# Patient Record
Sex: FEMALE | Race: WHITE | NOT HISPANIC OR LATINO
[De-identification: names, ages, dates, MRNs, and addresses within clinical notes are randomized per-mention and may not be internally consistent; named-entity substitution may affect disease eponyms.]

---
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
## 2022-04-04 ENCOUNTER — EMERGENCY (EMERGENCY)
Facility: HOSPITAL | Age: 22
LOS: 1 days | Discharge: HOME | End: 2022-04-04
Attending: EMERGENCY MEDICINE
Payer: COMMERCIAL

## 2022-04-04 VITALS
SYSTOLIC BLOOD PRESSURE: 116 MMHG | DIASTOLIC BLOOD PRESSURE: 81 MMHG | WEIGHT: 119.93 LBS | OXYGEN SATURATION: 99 % | RESPIRATION RATE: 18 BRPM | HEIGHT: 65 IN | HEART RATE: 96 BPM

## 2022-04-04 DIAGNOSIS — S61.512A LACERATION WITHOUT FOREIGN BODY OF LEFT WRIST, INITIAL ENCOUNTER: ICD-10-CM
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
## 2022-04-04 PROCEDURE — 12001 RPR S/N/AX/GEN/TRNK 2.5CM/<: CPT

## 2022-04-04 PROCEDURE — 99284 EMERGENCY DEPT VISIT MOD MDM: CPT | Mod: 25

## 2022-04-04 PROCEDURE — 99282 EMERGENCY DEPT VISIT SF MDM: CPT | Mod: 25

## 2022-04-04 NOTE — ED PROVIDER NOTE - NS ED ATTENDING STATEMENT MOD
This was a shared visit with the MICHELLE. I reviewed and verified the documentation and independently performed the documented:

## 2022-04-04 NOTE — ED PROVIDER NOTE - ATTENDING CONTRIBUTION TO CARE
Shaheen: I performed a face to face bedside interview with patient regarding history of present illness, review of symptoms and past medical history. I completed an independent physical exam.  I have discussed patient's plan of care with advanced care provider.   I agree with note as stated above including HISTORY OF PRESENT ILLNESS, HIV, PAST MEDICAL/SURGICAL/FAMILY/SOCIAL HISTORY, ALLERGIES AND HOME MEDICATIONS, REVIEW OF SYSTEMS, PHYSICAL EXAM, MEDICAL DECISION MAKING and any PROGRESS NOTES during the time I functioned as the attending physician for this patient  unless otherwise noted. My brief assessment is as follows: left wrist lac from  at work, unintentional, no tendon involvement, can range, neurovasuclalry intact. ~2cm. irrigated/wound closure by AMERICA Jaramillo, wound care instructions. return precautions and f/u.

## 2022-04-04 NOTE — ED PROVIDER NOTE - OBJECTIVE STATEMENT
pt is a 22 y/o female presenting to the ed for laceration to the left wrist. pt states she works at [employer], cut her wrist on a  today. pt up to date with tetanus, pt denies head injury or LOC. pt denies headache neck pain visual changes abd pain nausea vomiting numbness or loss of sensation cp sob

## 2022-04-04 NOTE — ED ADULT TRIAGE NOTE - CHIEF COMPLAINT QUOTE
Pt presents to ED for eval of laceration to left outer wrist. Pt states that she was cleaning her home and came in contact with an open  she has due to her jobs. Pt denies pain. No active bleeding noted.

## 2022-04-04 NOTE — ED PROVIDER NOTE - CLINICAL SUMMARY MEDICAL DECISION MAKING FREE TEXT BOX
laceration repair, wound care explained to patient signs of infection discussed  up to date with tetanus  suture removal in 10 days

## 2022-04-04 NOTE — ED PROVIDER NOTE - PHYSICAL EXAMINATION
Const: Awake, alert and oriented. In no acute distress. Well appearing.  HEENT: NC/AT. Moist mucous membranes.  Eyes: No scleral icterus. EOMI.  Neck:. Soft and supple. Full ROM without pain.  Cardiac:  +S1/S2. No murmurs. Peripheral pulses 2+ and symmetric. No LE edema.  Resp: Speaking in full sentences. No evidence of respiratory distress. No wheezes, rales or rhonchi.  Abd: Soft, non-tender, non-distended. Normal bowel sounds in all 4 quadrants. No guarding or rebound.  Back: Spine midline and non-tender. No CVAT.  MSK: FROM in all extremities neurovasculary intact radial pulse 2+ hand  5/5   Skin: lacaeration noted to left wrist 2.0 cm no tendon involvement   Lymph: No cervical lymphadenopathy.  Neuro: Awake, alert & oriented x 3. Moves all extremities symmetrically.

## 2022-04-04 NOTE — ED PROVIDER NOTE - PATIENT PORTAL LINK FT
You can access the FollowMyHealth Patient Portal offered by [facility] by registering at the following website: [email or web address]. By joining [facility]’s FollowMyHealth portal, you will also be able to view your health information using other applications (apps) compatible with our system.

## 2024-09-25 ENCOUNTER — NON-APPOINTMENT (OUTPATIENT)
Age: 24
End: 2024-09-25

## 2024-09-26 ENCOUNTER — APPOINTMENT (OUTPATIENT)
Dept: ORTHOPEDIC SURGERY | Facility: CLINIC | Age: 24
End: 2024-09-26

## 2024-09-26 VITALS — WEIGHT: 151 LBS | BODY MASS INDEX: 25.16 KG/M2 | HEIGHT: 65 IN

## 2024-09-26 DIAGNOSIS — M25.542 PAIN IN JOINTS OF LEFT HAND: ICD-10-CM

## 2024-09-26 PROBLEM — S52.122A LEFT RADIAL HEAD FRACTURE: Status: ACTIVE | Noted: 2024-09-26

## 2024-09-26 PROCEDURE — 73130 X-RAY EXAM OF HAND: CPT | Mod: LT

## 2024-09-26 PROCEDURE — 73080 X-RAY EXAM OF ELBOW: CPT | Mod: LT

## 2024-09-26 PROCEDURE — 99203 OFFICE O/P NEW LOW 30 MIN: CPT
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
## 2024-09-26 NOTE — PHYSICAL EXAM
[de-identified] : Left hand and upper extremity -Digits warm well-perfused, mildly swollen distally.  There is mild tenderness palpation over the ring finger mostly at the PIP joint.  She is able to make a full composite fist. -No wrist pain, no instability at the DRUJ.  No deformity throughout the hand and wrist. Left elbow -Passively full pronosupination, there is pain with supination and pronation but no block to motion -Able to flex and extend the elbow, unable to reach full terminal extension secondary to stiffness -No tenderness palpation throughout the remainder of the upper extremity [de-identified] : Three-view x-ray today including AP, lateral, oblique taken of the left elbow these demonstrate a minimally displaced radial head fracture in acceptable alignment for nonoperative parameters, slight articular step-off  Three-view x-ray left hand and 1 view lateral of the digit demonstrate no bony abnormalities, region of PIP joint where she had pain has no fractures, joint reduced and stable

## 2024-10-03 ENCOUNTER — APPOINTMENT (OUTPATIENT)
Dept: CT IMAGING | Facility: CLINIC | Age: 24
End: 2024-10-03
Payer: COMMERCIAL

## 2024-10-03 ENCOUNTER — APPOINTMENT (OUTPATIENT)
Dept: ORTHOPEDIC SURGERY | Facility: CLINIC | Age: 24
End: 2024-10-03

## 2024-10-03 DIAGNOSIS — S52.122A DISPLACED FRACTURE OF HEAD OF LEFT RADIUS, INITIAL ENCOUNTER FOR CLOSED FRACTURE: ICD-10-CM

## 2024-10-03 PROCEDURE — 73200 CT UPPER EXTREMITY W/O DYE: CPT | Mod: 26,LT

## 2024-10-03 PROCEDURE — 99213 OFFICE O/P EST LOW 20 MIN: CPT

## 2024-10-03 PROCEDURE — 73080 X-RAY EXAM OF ELBOW: CPT | Mod: LT

## 2024-10-03 NOTE — PHYSICAL EXAM
[de-identified] : Left hand and upper extremity  -No wrist pain, no instability at the DRUJ.  No deformity throughout the hand and wrist. Left elbow -Passively full pronosupination, there is pain with supination and pronation but no block to motion -Able to flex and extend the elbow, unable to reach full terminal extension secondary to stiffness -No tenderness palpation throughout the remainder of the upper extremity [de-identified] : 4 view x-ray including AP, lateral, oblique, and Jesus view of the left elbow taken today, is in acceptable alignment on the lateral views, on an AP view there is displacement of the fracture
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
## 2024-10-03 NOTE — ASSESSMENT
[FreeTextEntry1] : Given the patient's age and concern for worsening joint incongruity I would like to obtain a CT scan to further evaluate and define the radial head fracture as well as to find the size of the fragment seen on the AP view.  Discussed with Sophie that the steps going forward will be discussed over the phone, there may be a recommendation of surgery depending on the findings on the CT scan.  She verbalized understanding and acceptance of this plan.

## 2024-10-21 ENCOUNTER — APPOINTMENT (OUTPATIENT)
Dept: ORTHOPEDIC SURGERY | Facility: CLINIC | Age: 24
End: 2024-10-21
Payer: COMMERCIAL

## 2024-10-21 VITALS — WEIGHT: 154 LBS | HEIGHT: 64 IN | BODY MASS INDEX: 26.29 KG/M2

## 2024-10-21 DIAGNOSIS — S52.122A DISPLACED FRACTURE OF HEAD OF LEFT RADIUS, INITIAL ENCOUNTER FOR CLOSED FRACTURE: ICD-10-CM

## 2024-10-21 PROCEDURE — 99213 OFFICE O/P EST LOW 20 MIN: CPT

## 2024-10-21 PROCEDURE — 73080 X-RAY EXAM OF ELBOW: CPT | Mod: LT

## 2025-02-27 ENCOUNTER — OFFICE (OUTPATIENT)
Dept: URBAN - METROPOLITAN AREA CLINIC 100 | Facility: CLINIC | Age: 25
Setting detail: OPHTHALMOLOGY
End: 2025-02-27
Payer: COMMERCIAL

## 2025-02-27 DIAGNOSIS — H40.013: ICD-10-CM

## 2025-02-27 PROCEDURE — 92083 EXTENDED VISUAL FIELD XM: CPT | Performed by: OPHTHALMOLOGY

## 2025-02-27 PROCEDURE — 76514 ECHO EXAM OF EYE THICKNESS: CPT | Performed by: OPHTHALMOLOGY

## 2025-02-27 PROCEDURE — 99204 OFFICE O/P NEW MOD 45 MIN: CPT | Performed by: OPHTHALMOLOGY

## 2025-02-27 PROCEDURE — 92250 FUNDUS PHOTOGRAPHY W/I&R: CPT | Performed by: OPHTHALMOLOGY

## 2025-02-27 ASSESSMENT — REFRACTION_AUTOREFRACTION
OD_AXIS: 160
OS_CYLINDER: -0.25
OS_AXIS: 030
OS_SPHERE: +0.25
OD_SPHERE: -1.00
OD_CYLINDER: -3.00

## 2025-02-27 ASSESSMENT — KERATOMETRY
OD_K1POWER_DIOPTERS: 44.25
OD_AXISANGLE_DEGREES: 062
OS_K2POWER_DIOPTERS: 44.75
OS_AXISANGLE_DEGREES: 162
OS_K1POWER_DIOPTERS: 44.50
OD_K2POWER_DIOPTERS: 46.25

## 2025-02-27 ASSESSMENT — PACHYMETRY
OD_CT_UM: 612
OD_CT_CORRECTION: -5
OS_CT_UM: 607
OS_CT_CORRECTION: -4

## 2025-02-27 ASSESSMENT — VISUAL ACUITY
OS_BCVA: 20/300
OD_BCVA: 20/20

## 2025-02-27 ASSESSMENT — CONFRONTATIONAL VISUAL FIELD TEST (CVF)
OD_FINDINGS: FULL
OS_FINDINGS: FULL

## 2025-03-25 ENCOUNTER — NON-APPOINTMENT (OUTPATIENT)
Age: 25
End: 2025-03-25

## 2025-07-13 ENCOUNTER — NON-APPOINTMENT (OUTPATIENT)
Age: 25
End: 2025-07-13